# Patient Record
Sex: MALE | Race: WHITE | URBAN - METROPOLITAN AREA
[De-identification: names, ages, dates, MRNs, and addresses within clinical notes are randomized per-mention and may not be internally consistent; named-entity substitution may affect disease eponyms.]

---

## 2021-09-19 ENCOUNTER — EMERGENCY (EMERGENCY)
Facility: HOSPITAL | Age: 31
LOS: 0 days | Discharge: HOME | End: 2021-09-19
Attending: STUDENT IN AN ORGANIZED HEALTH CARE EDUCATION/TRAINING PROGRAM
Payer: COMMERCIAL

## 2021-09-19 VITALS — WEIGHT: 199.96 LBS | HEIGHT: 72 IN

## 2021-09-19 VITALS
SYSTOLIC BLOOD PRESSURE: 121 MMHG | RESPIRATION RATE: 14 BRPM | TEMPERATURE: 98 F | OXYGEN SATURATION: 100 % | DIASTOLIC BLOOD PRESSURE: 74 MMHG | HEART RATE: 60 BPM

## 2021-09-19 DIAGNOSIS — R51.9 HEADACHE, UNSPECIFIED: ICD-10-CM

## 2021-09-19 DIAGNOSIS — R55 SYNCOPE AND COLLAPSE: ICD-10-CM

## 2021-09-19 DIAGNOSIS — Z87.820 PERSONAL HISTORY OF TRAUMATIC BRAIN INJURY: ICD-10-CM

## 2021-09-19 LAB
ALBUMIN SERPL ELPH-MCNC: 4.3 G/DL — SIGNIFICANT CHANGE UP (ref 3.3–5)
ALP SERPL-CCNC: 71 U/L — SIGNIFICANT CHANGE UP (ref 40–120)
ALT FLD-CCNC: 47 U/L — SIGNIFICANT CHANGE UP (ref 12–78)
ANION GAP SERPL CALC-SCNC: 6 MMOL/L — SIGNIFICANT CHANGE UP (ref 5–17)
AST SERPL-CCNC: 29 U/L — SIGNIFICANT CHANGE UP (ref 15–37)
BASOPHILS # BLD AUTO: 0.04 K/UL — SIGNIFICANT CHANGE UP (ref 0–0.2)
BASOPHILS NFR BLD AUTO: 0.5 % — SIGNIFICANT CHANGE UP (ref 0–2)
BILIRUB SERPL-MCNC: 0.7 MG/DL — SIGNIFICANT CHANGE UP (ref 0.2–1.2)
BUN SERPL-MCNC: 20 MG/DL — SIGNIFICANT CHANGE UP (ref 7–23)
CALCIUM SERPL-MCNC: 9.2 MG/DL — SIGNIFICANT CHANGE UP (ref 8.5–10.1)
CHLORIDE SERPL-SCNC: 109 MMOL/L — HIGH (ref 96–108)
CO2 SERPL-SCNC: 25 MMOL/L — SIGNIFICANT CHANGE UP (ref 22–31)
CREAT SERPL-MCNC: 1 MG/DL — SIGNIFICANT CHANGE UP (ref 0.5–1.3)
EOSINOPHIL # BLD AUTO: 0.06 K/UL — SIGNIFICANT CHANGE UP (ref 0–0.5)
EOSINOPHIL NFR BLD AUTO: 0.7 % — SIGNIFICANT CHANGE UP (ref 0–6)
GLUCOSE SERPL-MCNC: 93 MG/DL — SIGNIFICANT CHANGE UP (ref 70–99)
HCT VFR BLD CALC: 39.2 % — SIGNIFICANT CHANGE UP (ref 39–50)
HGB BLD-MCNC: 14.4 G/DL — SIGNIFICANT CHANGE UP (ref 13–17)
IMM GRANULOCYTES NFR BLD AUTO: 0.2 % — SIGNIFICANT CHANGE UP (ref 0–1.5)
LYMPHOCYTES # BLD AUTO: 2.23 K/UL — SIGNIFICANT CHANGE UP (ref 1–3.3)
LYMPHOCYTES # BLD AUTO: 26.5 % — SIGNIFICANT CHANGE UP (ref 13–44)
MCHC RBC-ENTMCNC: 32 PG — SIGNIFICANT CHANGE UP (ref 27–34)
MCHC RBC-ENTMCNC: 36.7 GM/DL — HIGH (ref 32–36)
MCV RBC AUTO: 87.1 FL — SIGNIFICANT CHANGE UP (ref 80–100)
MONOCYTES # BLD AUTO: 0.6 K/UL — SIGNIFICANT CHANGE UP (ref 0–0.9)
MONOCYTES NFR BLD AUTO: 7.1 % — SIGNIFICANT CHANGE UP (ref 2–14)
NEUTROPHILS # BLD AUTO: 5.45 K/UL — SIGNIFICANT CHANGE UP (ref 1.8–7.4)
NEUTROPHILS NFR BLD AUTO: 65 % — SIGNIFICANT CHANGE UP (ref 43–77)
PLATELET # BLD AUTO: 267 K/UL — SIGNIFICANT CHANGE UP (ref 150–400)
POTASSIUM SERPL-MCNC: 3.6 MMOL/L — SIGNIFICANT CHANGE UP (ref 3.5–5.3)
POTASSIUM SERPL-SCNC: 3.6 MMOL/L — SIGNIFICANT CHANGE UP (ref 3.5–5.3)
PROT SERPL-MCNC: 8.3 GM/DL — SIGNIFICANT CHANGE UP (ref 6–8.3)
RBC # BLD: 4.5 M/UL — SIGNIFICANT CHANGE UP (ref 4.2–5.8)
RBC # FLD: 11.5 % — SIGNIFICANT CHANGE UP (ref 10.3–14.5)
SODIUM SERPL-SCNC: 140 MMOL/L — SIGNIFICANT CHANGE UP (ref 135–145)
TROPONIN I SERPL-MCNC: <0.015 NG/ML — SIGNIFICANT CHANGE UP (ref 0.01–0.04)
TROPONIN I SERPL-MCNC: <0.015 NG/ML — SIGNIFICANT CHANGE UP (ref 0.01–0.04)
WBC # BLD: 8.4 K/UL — SIGNIFICANT CHANGE UP (ref 3.8–10.5)
WBC # FLD AUTO: 8.4 K/UL — SIGNIFICANT CHANGE UP (ref 3.8–10.5)

## 2021-09-19 PROCEDURE — 99285 EMERGENCY DEPT VISIT HI MDM: CPT

## 2021-09-19 PROCEDURE — 36415 COLL VENOUS BLD VENIPUNCTURE: CPT

## 2021-09-19 PROCEDURE — 70450 CT HEAD/BRAIN W/O DYE: CPT

## 2021-09-19 PROCEDURE — 93005 ELECTROCARDIOGRAM TRACING: CPT

## 2021-09-19 PROCEDURE — 96374 THER/PROPH/DIAG INJ IV PUSH: CPT

## 2021-09-19 PROCEDURE — 99284 EMERGENCY DEPT VISIT MOD MDM: CPT | Mod: 25

## 2021-09-19 PROCEDURE — 80053 COMPREHEN METABOLIC PANEL: CPT

## 2021-09-19 PROCEDURE — 93010 ELECTROCARDIOGRAM REPORT: CPT | Mod: 76

## 2021-09-19 PROCEDURE — 84484 ASSAY OF TROPONIN QUANT: CPT

## 2021-09-19 PROCEDURE — 70450 CT HEAD/BRAIN W/O DYE: CPT | Mod: 26,MA

## 2021-09-19 PROCEDURE — 85025 COMPLETE CBC W/AUTO DIFF WBC: CPT

## 2021-09-19 RX ORDER — SODIUM CHLORIDE 9 MG/ML
1000 INJECTION INTRAMUSCULAR; INTRAVENOUS; SUBCUTANEOUS ONCE
Refills: 0 | Status: COMPLETED | OUTPATIENT
Start: 2021-09-19 | End: 2021-09-19

## 2021-09-19 RX ORDER — ACETAMINOPHEN 500 MG
975 TABLET ORAL ONCE
Refills: 0 | Status: COMPLETED | OUTPATIENT
Start: 2021-09-19 | End: 2021-09-19

## 2021-09-19 RX ORDER — METOCLOPRAMIDE HCL 10 MG
10 TABLET ORAL ONCE
Refills: 0 | Status: COMPLETED | OUTPATIENT
Start: 2021-09-19 | End: 2021-09-19

## 2021-09-19 RX ADMIN — Medication 975 MILLIGRAM(S): at 16:28

## 2021-09-19 RX ADMIN — SODIUM CHLORIDE 1000 MILLILITER(S): 9 INJECTION INTRAMUSCULAR; INTRAVENOUS; SUBCUTANEOUS at 16:28

## 2021-09-19 RX ADMIN — Medication 10 MILLIGRAM(S): at 16:28

## 2021-09-19 NOTE — ED STATDOCS - CHPI ED SYMPTOM NEG
no numbness/weakness/tingling, no incontinence, no tongue-biting, no extremity shaking, no rashes/no nausea/no chest pain/no vomiting
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
## 2021-09-19 NOTE — ED STATDOCS - NSFOLLOWUPINSTRUCTIONS_ED_ALL_ED_FT
You were seen in the emergency department for: syncope  Your diagnosis for this visit was: syncope     Your lab work and imaging results are attached.     You may be contacted by the Emergency Department Referrals Coordinator to set up your follow-up appointment within 24-48 hours of your discharge, Monday to Friday. We recommend you follow up with: cardiology and neurology     Please return to the Emergency Department if you experience any of the following symptoms:   - Shortness of breath or trouble breathing  - Pressure, pain or tightness in the chest  - Face drooping, arm weakness or speech difficulty  - Persistence of severe vomiting  - Head injury or loss of consciousness  - Nonstop bleeding or an open wound    (1) Follow up with your primary care physician within the next 24-48 hours as discussed. In addition, we did not find evidence of a life threatening illness on your testing here today, but listed below are the specialists that will be necessary to see as an outpatient to continue the workup.  Please call the numbers listed below or [phone] to set up the necessary appointments.  (2) Take Tylenol (up to 1000mg or 1 g)  and/or Motrin (up to 600mg) up to every 6 hours as needed for pain.   (3) If you had an IV (intravenous) line placed, it was removed. Sometimes, after IV removal, that area can be tender for a few days; if it develops redness and swelling, those could be signs of infection; in which case, return to the Emergency Department for assessment.  (4) Please continue taking all of your home medications as directed.    Follow up with your PMD and/or cardiologist within 48-72 hours. Show copies of your reports given to you. Take all of your other medications as previously prescribed.     ****For Cardiology: Call [phone] to schedule an appointment ASAP. Most patients can be seen within 48 hours. Mention that you were just discharged from the emergency room and need to be seen immediately.    You should return to the hospital if you begin to have worsening or persistent chest pain especially that radiates to the arm/jaw/back, shortness of breath or chest pain with exertion that is different than normal for you, new or worsening in any lower extremity edema (swelling), sudden onset of cold sweats with difficulty breathing, or for any other concerns.    Form more information on Heart Health please visit the American Heart Association's Website at: [email or web address]

## 2021-09-19 NOTE — ED STATDOCS - NSFOLLOWUPCLINICS_GEN_ALL_ED_FT
[facility]  Cardiology  [address] [place] [address]  Phone: [phone]  Fax:     [facility]  Neurology  [address] [address]  Phone: [phone]  Fax:

## 2021-09-19 NOTE — ED STATDOCS - PATIENT PORTAL LINK FT
You can access the FollowMyHealth Patient Portal offered by [facility] by registering at the following website: [email or web address]. By joining [employer]’s FollowMyHealth portal, you will also be able to view your health information using other applications (apps) compatible with our system.

## 2021-09-19 NOTE — ED STATDOCS - NS ED SCRIBE STATEMENT
Attending Skin Substitute Text: The defect edges were debeveled with a #15 scalpel blade.  Given the location of the defect, shape of the defect and the proximity to free margins a skin substitute graft was deemed most appropriate.  The graft material was trimmed to fit the size of the defect. The graft was then placed in the primary defect and oriented appropriately.

## 2021-09-19 NOTE — ED STATDOCS - ATTENDING CONTRIBUTION TO CARE
I, Che Mckeon DO,  performed the initial face to face bedside interview with this patient regarding history of present illness, review of symptoms and relevant past medical, social and family history.  I completed an independent physical examination.  I was the initial provider who evaluated this patient. I have signed out the follow up of any pending tests (i.e. labs, radiological studies) to the resident.  I have communicated the patient’s plan of care and disposition with the resident.  The history, relevant review of systems, past medical and surgical history, medical decision making, and physical examination was documented by the scribe in my presence and I attest to the accuracy of the documentation.
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
## 2021-09-19 NOTE — ED STATDOCS - PROGRESS NOTE DETAILS
Mike CHARLES: Pending sec troponin at this time; patient feeling better; s/o to Dr. Villagomez pending sec trop at this time. Bhavna Cramer MD (PGY2) -  Pt seen & reassessed.  Pt symptomatically improved.  NAD, VSS, pt ambulated w/steady unassisted gait in the ED.  We discussed the results of ED w/u w/patient (incl. presumptive Emergency Department dx, associated anticipatory guidance, stressing importance of prompt f/u, return precautions), & gave them a copy of results.  Patient verbalized understanding of ED course & agreed with our f/u recommendations, has decisional making capacity.  Pt st they will f/u w/PMD within the next 3 days; pt agrees to call today or tomorrow for an appointment. Pt agrees to return to the ED if there is any worsening or concerning symptoms.

## 2021-09-21 DIAGNOSIS — Z87.820 PERSONAL HISTORY OF TRAUMATIC BRAIN INJURY: ICD-10-CM

## 2021-09-21 DIAGNOSIS — R51.9 HEADACHE, UNSPECIFIED: ICD-10-CM

## 2021-09-23 ENCOUNTER — APPOINTMENT (OUTPATIENT)
Dept: CARDIOLOGY | Facility: CLINIC | Age: 31
End: 2021-09-23
Payer: COMMERCIAL

## 2021-09-23 ENCOUNTER — NON-APPOINTMENT (OUTPATIENT)
Age: 31
End: 2021-09-23

## 2021-09-23 VITALS
DIASTOLIC BLOOD PRESSURE: 91 MMHG | HEIGHT: 72 IN | SYSTOLIC BLOOD PRESSURE: 132 MMHG | TEMPERATURE: 97.6 F | BODY MASS INDEX: 28.85 KG/M2 | WEIGHT: 213 LBS | HEART RATE: 63 BPM | RESPIRATION RATE: 16 BRPM | OXYGEN SATURATION: 98 %

## 2021-09-23 DIAGNOSIS — R07.89 OTHER CHEST PAIN: ICD-10-CM

## 2021-09-23 DIAGNOSIS — Z87.438 PERSONAL HISTORY OF OTHER DISEASES OF MALE GENITAL ORGANS: ICD-10-CM

## 2021-09-23 DIAGNOSIS — Z87.898 PERSONAL HISTORY OF OTHER SPECIFIED CONDITIONS: ICD-10-CM

## 2021-09-23 DIAGNOSIS — Z92.29 PERSONAL HISTORY OF OTHER DRUG THERAPY: ICD-10-CM

## 2021-09-23 PROBLEM — Z87.820 PERSONAL HISTORY OF TRAUMATIC BRAIN INJURY: Chronic | Status: ACTIVE | Noted: 2021-09-19

## 2021-09-23 PROCEDURE — 93000 ELECTROCARDIOGRAM COMPLETE: CPT

## 2021-09-23 PROCEDURE — 99204 OFFICE O/P NEW MOD 45 MIN: CPT

## 2021-09-24 ENCOUNTER — NON-APPOINTMENT (OUTPATIENT)
Age: 31
End: 2021-09-24

## 2021-09-24 ENCOUNTER — APPOINTMENT (OUTPATIENT)
Dept: NEUROLOGY | Facility: CLINIC | Age: 31
End: 2021-09-24
Payer: COMMERCIAL

## 2021-09-24 PROCEDURE — 99204 OFFICE O/P NEW MOD 45 MIN: CPT

## 2021-09-24 PROCEDURE — 95816 EEG AWAKE AND DROWSY: CPT

## 2021-09-24 NOTE — DATA REVIEWED
[de-identified] :  EXAM:  CT BRAIN                      \par \par \par PROCEDURE DATE:  09/19/2021  \par \par \par \par INTERPRETATION:  INDICATION:  Headache with amnesia.\par TECHNIQUE:  A non contrast 2.5 or 3 mm axial CT study of the brain was performed from skull base to vertex. Coronal and sagittal reformations were generated from the axial data.\par COMPARISON EXAMINATION:  CT dated 3/24/2012\par \par FINDINGS:\par \par HEMISPHERES:  No mass or space occupying lesion.  No acute ischemic changes or hemorrhagic foci are suggested.\par VENTRICLES:  Midline and normal in size.\par POSTERIOR FOSSA:  The brain stem and cerebellum are unremarkable.  No CP angle lesion noted.\par EXTRACEREBRAL SPACES:  No subdural or epidural collections are noted.\par SKULL BASE AND CALVARIUM:  Appears intact.  No fracture or destructive lesion is identified.\par SINUSES AND MASTOIDS:  Clear.\par MISCELLANEOUS:  No orbital or suprasellar abnormality noted.\par \par IMPRESSION:\par \par 1)  unremarkable CT study of the brain. No ischemic changes or hemorrhagic foci are suggested. No space-occupying lesion noted.\par 2)  clear sinuses and mastoids..\par
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
## 2021-09-24 NOTE — PHYSICAL EXAM
[General Appearance - Alert] : alert [General Appearance - In No Acute Distress] : in no acute distress [Oriented To Time, Place, And Person] : oriented to person, place, and time [Impaired Insight] : insight and judgment were intact [Affect] : the affect was normal [Person] : oriented to person [Place] : oriented to place [Time] : oriented to time [Concentration Intact] : normal concentrating ability [Visual Intact] : visual attention was ~T not ~L decreased [Naming Objects] : no difficulty naming common objects [Repeating Phrases] : no difficulty repeating a phrase [Writing A Sentence] : no difficulty writing a sentence [Fluency] : fluency intact [Comprehension] : comprehension intact [Reading] : reading intact [Past History] : adequate knowledge of personal past history [Cranial Nerves Optic (II)] : visual acuity intact bilaterally,  visual fields full to confrontation, pupils equal round and reactive to light [Cranial Nerves Oculomotor (III)] : extraocular motion intact [Cranial Nerves Trigeminal (V)] : facial sensation intact symmetrically [Cranial Nerves Facial (VII)] : face symmetrical [Cranial Nerves Glossopharyngeal (IX)] : tongue and palate midline [Cranial Nerves Vestibulocochlear (VIII)] : hearing was intact bilaterally [Cranial Nerves Accessory (XI - Cranial And Spinal)] : head turning and shoulder shrug symmetric [Cranial Nerves Hypoglossal (XII)] : there was no tongue deviation with protrusion [Motor Tone] : muscle tone was normal in all four extremities [Motor Strength] : muscle strength was normal in all four extremities [No Muscle Atrophy] : normal bulk in all four extremities [Sensation Tactile Decrease] : light touch was intact [Abnormal Walk] : normal gait [Balance] : balance was intact [2+] : Ankle jerk left 2+ [Neck Appearance] : the appearance of the neck was normal [Heart Rate And Rhythm] : heart rate was normal and rhythm regular [Heart Sounds] : normal S1 and S2 [Heart Sounds Gallop] : no gallops [Murmurs] : no murmurs [Arterial Pulses Carotid] : carotid pulses were normal with no bruits [Full Pulse] : the pedal pulses are present [Edema] : there was no peripheral edema [Past-pointing] : there was no past-pointing [Tremor] : no tremor present [Plantar Reflex Right Only] : normal on the right [Plantar Reflex Left Only] : normal on the left

## 2021-09-27 NOTE — CARDIOLOGY SUMMARY
[de-identified] : \par 09/23/21 - sinus bradycardia, ST elevations, consider early repolarization versus pericarditis, NV elevation in AVR\par 09/19/21 ([place]) - sinus rhythm, normal ECG

## 2021-09-27 NOTE — DISCUSSION/SUMMARY
[Vasovagal Syncope] : vasovagal syncope [Stable] : stable [FreeTextEntry1] : \par Currently stable from a cardiovascular standpoint. Elevated diastolic pressure today. Appears euvolemic. Episode of syncope possibly secondary to vasovagal event but cannot rule out arrhythmia. Chest pain of undetermined etiology but consider pericarditis given ECG findings today (ST and AR segment changes suggestive of pericarditis). Trial of NSAIDs for chest pain. ECG completed today and reviewed (findings as noted above). Will schedule an echocardiogram to assess his cardiac structures and function. Advised patient to maintain adequate hydration. Pending echo results and clinical course, will consider event monitor to rule out arrhythmia.
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
## 2021-09-27 NOTE — REVIEW OF SYSTEMS
[Chest Discomfort] : chest discomfort [Syncope] : syncope [Negative] : Musculoskeletal [SOB] : no shortness of breath [Dyspnea on exertion] : not dyspnea during exertion [Lower Ext Edema] : no extremity edema [Leg Claudication] : no intermittent leg claudication [Palpitations] : no palpitations [Orthopnea] : no orthopnea [PND] : no PND

## 2021-09-28 ENCOUNTER — APPOINTMENT (OUTPATIENT)
Dept: CV DIAGNOSITCS | Facility: HOSPITAL | Age: 31
End: 2021-09-28

## 2021-09-28 ENCOUNTER — OUTPATIENT (OUTPATIENT)
Dept: OUTPATIENT SERVICES | Facility: HOSPITAL | Age: 31
LOS: 1 days | End: 2021-09-28
Payer: COMMERCIAL

## 2021-09-28 DIAGNOSIS — R07.89 OTHER CHEST PAIN: ICD-10-CM

## 2021-09-28 PROCEDURE — 76377 3D RENDER W/INTRP POSTPROCES: CPT | Mod: 26

## 2021-09-28 PROCEDURE — 93306 TTE W/DOPPLER COMPLETE: CPT | Mod: 26

## 2021-09-28 PROCEDURE — 93306 TTE W/DOPPLER COMPLETE: CPT

## 2021-09-28 PROCEDURE — 76377 3D RENDER W/INTRP POSTPROCES: CPT

## 2021-09-30 DIAGNOSIS — I31.9 DISEASE OF PERICARDIUM, UNSPECIFIED: ICD-10-CM

## 2021-10-01 ENCOUNTER — RESULT REVIEW (OUTPATIENT)
Age: 31
End: 2021-10-01

## 2021-10-01 RX ORDER — OMEPRAZOLE 40 MG/1
40 CAPSULE, DELAYED RELEASE ORAL DAILY
Qty: 30 | Refills: 5 | Status: ACTIVE | COMMUNITY
Start: 2021-10-01 | End: 1900-01-01

## 2021-10-01 RX ORDER — COLCHICINE 0.6 MG/1
0.6 TABLET ORAL DAILY
Qty: 30 | Refills: 5 | Status: DISCONTINUED | COMMUNITY
Start: 2021-09-30 | End: 2021-10-01

## 2021-10-07 ENCOUNTER — OUTPATIENT (OUTPATIENT)
Dept: OUTPATIENT SERVICES | Facility: HOSPITAL | Age: 31
LOS: 1 days | End: 2021-10-07
Payer: COMMERCIAL

## 2021-10-07 ENCOUNTER — OUTPATIENT (OUTPATIENT)
Dept: OUTPATIENT SERVICES | Facility: HOSPITAL | Age: 31
LOS: 1 days | End: 2021-10-07

## 2021-10-07 ENCOUNTER — TRANSCRIPTION ENCOUNTER (OUTPATIENT)
Age: 31
End: 2021-10-07

## 2021-10-07 ENCOUNTER — APPOINTMENT (OUTPATIENT)
Dept: CT IMAGING | Facility: CLINIC | Age: 31
End: 2021-10-07
Payer: COMMERCIAL

## 2021-10-07 DIAGNOSIS — R07.89 OTHER CHEST PAIN: ICD-10-CM

## 2021-10-07 DIAGNOSIS — Z00.8 ENCOUNTER FOR OTHER GENERAL EXAMINATION: ICD-10-CM

## 2021-10-07 PROCEDURE — 71275 CT ANGIOGRAPHY CHEST: CPT

## 2021-10-07 PROCEDURE — 71275 CT ANGIOGRAPHY CHEST: CPT | Mod: 26

## 2021-10-11 ENCOUNTER — OUTPATIENT (OUTPATIENT)
Dept: OUTPATIENT SERVICES | Facility: HOSPITAL | Age: 31
LOS: 1 days | End: 2021-10-11
Payer: COMMERCIAL

## 2021-10-11 ENCOUNTER — APPOINTMENT (OUTPATIENT)
Dept: MRI IMAGING | Facility: CLINIC | Age: 31
End: 2021-10-11
Payer: COMMERCIAL

## 2021-10-11 DIAGNOSIS — R51.9 HEADACHE, UNSPECIFIED: ICD-10-CM

## 2021-10-11 DIAGNOSIS — Z87.898 PERSONAL HISTORY OF OTHER SPECIFIED CONDITIONS: ICD-10-CM

## 2021-10-11 DIAGNOSIS — Z00.8 ENCOUNTER FOR OTHER GENERAL EXAMINATION: ICD-10-CM

## 2021-10-11 PROCEDURE — A9585: CPT

## 2021-10-11 PROCEDURE — 70553 MRI BRAIN STEM W/O & W/DYE: CPT | Mod: 26

## 2021-10-11 PROCEDURE — 70553 MRI BRAIN STEM W/O & W/DYE: CPT

## 2021-10-14 ENCOUNTER — APPOINTMENT (OUTPATIENT)
Dept: NEUROLOGY | Facility: CLINIC | Age: 31
End: 2021-10-14

## 2021-10-14 DIAGNOSIS — R20.0 ANESTHESIA OF SKIN: ICD-10-CM

## 2021-10-14 NOTE — DATA REVIEWED
[de-identified] :  EXAM:  MR BRAIN WAW IC  \par \par \par PROCEDURE DATE:  10/11/2021  \par  \par \par \par INTERPRETATION:  INDICATIONS:  Headaches and amnesia\par \par TECHNIQUE:  Multiplanar imaging was performed using T1 weighted, T2 weighted and FLAIR sequences.  Diffusion weighted and SWI images were also obtained.  Following intravenous gadolinium, multiplanar T1 weighted images were performed. 9.5 cc Gadavist were administered. 0.5 cc were discarded.\par \par COMPARISON EXAMINATION:  CT head 9/19/2021\par \par FINDINGS:\par VENTRICLES AND SULCI:  Normal.\par INTRA-AXIAL:  No mass, abnormal signal or evidence of acute cerebral ischemia. No abnormal enhancement.\par EXTRA-AXIAL:  No mass or collection.\par VISUALIZED SINUSES:  Normal.\par VISUALIZED MASTOIDS:  Clear.\par CALVARIUM:  Normal.\par CAROTID FLOW VOIDS:  Carotid flow voids are present in noncontrast enhanced images. Postcontrast images patent bilateral carotid arteries and large cerebral veins/sinuses without filling defects.\par MISCELLANEOUS:  None.\par \par IMPRESSION:  Normal pre- and post-contrast MRI of the brain.\par  [de-identified] : EEG: normal awake and drowsy EEG.

## 2021-10-20 ENCOUNTER — OUTPATIENT (OUTPATIENT)
Dept: OUTPATIENT SERVICES | Facility: HOSPITAL | Age: 31
LOS: 1 days | End: 2021-10-20
Payer: COMMERCIAL

## 2021-10-20 DIAGNOSIS — Z20.828 CONTACT WITH AND (SUSPECTED) EXPOSURE TO OTHER VIRAL COMMUNICABLE DISEASES: ICD-10-CM

## 2021-10-20 LAB — SARS-COV-2 RNA SPEC QL NAA+PROBE: SIGNIFICANT CHANGE UP

## 2021-10-20 PROCEDURE — 87635 SARS-COV-2 COVID-19 AMP PRB: CPT

## 2021-10-20 PROCEDURE — C9803: CPT

## 2021-10-21 DIAGNOSIS — Z20.828 CONTACT WITH AND (SUSPECTED) EXPOSURE TO OTHER VIRAL COMMUNICABLE DISEASES: ICD-10-CM

## 2022-04-26 ENCOUNTER — TRANSCRIPTION ENCOUNTER (OUTPATIENT)
Age: 32
End: 2022-04-26

## 2022-08-31 ENCOUNTER — NON-APPOINTMENT (OUTPATIENT)
Age: 32
End: 2022-08-31

## 2022-09-11 ENCOUNTER — NON-APPOINTMENT (OUTPATIENT)
Age: 32
End: 2022-09-11

## 2024-11-10 ENCOUNTER — NON-APPOINTMENT (OUTPATIENT)
Age: 34
End: 2024-11-10

## 2025-03-06 ENCOUNTER — NON-APPOINTMENT (OUTPATIENT)
Age: 35
End: 2025-03-06